# Patient Record
Sex: FEMALE | Race: WHITE
[De-identification: names, ages, dates, MRNs, and addresses within clinical notes are randomized per-mention and may not be internally consistent; named-entity substitution may affect disease eponyms.]

---

## 2020-12-28 ENCOUNTER — HOSPITAL ENCOUNTER (EMERGENCY)
Dept: HOSPITAL 46 - ED | Age: 50
Discharge: HOME | End: 2020-12-28
Payer: SELF-PAY

## 2020-12-28 VITALS — WEIGHT: 150 LBS | BODY MASS INDEX: 25.61 KG/M2 | HEIGHT: 64 IN

## 2020-12-28 DIAGNOSIS — F10.10: Primary | ICD-10-CM

## 2020-12-28 DIAGNOSIS — Z79.899: ICD-10-CM

## 2020-12-28 DIAGNOSIS — Z88.2: ICD-10-CM
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
## 2020-12-29 ENCOUNTER — HOSPITAL ENCOUNTER (EMERGENCY)
Dept: HOSPITAL 46 - ED | Age: 50
LOS: 1 days | Discharge: HOME | End: 2020-12-30
Payer: MEDICAID

## 2020-12-29 VITALS — BODY MASS INDEX: 25.61 KG/M2 | HEIGHT: 64 IN | WEIGHT: 150 LBS

## 2020-12-29 DIAGNOSIS — Z88.2: ICD-10-CM

## 2020-12-29 DIAGNOSIS — F10.129: ICD-10-CM
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
## 2020-12-29 DIAGNOSIS — T42.4X2A: Primary | ICD-10-CM

## 2020-12-29 DIAGNOSIS — Z79.899: ICD-10-CM

## 2020-12-29 PROCEDURE — G0480 DRUG TEST DEF 1-7 CLASSES: HCPCS

## 2020-12-30 NOTE — EKG
[facility]
                                    [address]
                                  [place], [place]  [address]
_________________________________________________________________________________________
                                                                 Signed   
 
 
Normal sinus rhythm
Rightward axis
Low voltage QRS
Nonspecific ST abnormality
Abnormal ECG
No previous ECGs available
Confirmed by DIONTE KIM DO ([id number]) on 12/30/2020 9:13:47 PM
 
 
 
 
 
 
 
 
 
 
 
 
 
 
 
 
 
 
 
 
 
 
 
 
 
 
 
 
 
 
 
 
 
 
 
 
 
 
    Electronically Signed By: DIONTE KIM DO  12/30/20 2114
_________________________________________________________________________________________
PATIENT NAME:     RUBI SHAIKH                
MEDICAL RECORD #: [id number]                     Electrocardiogram             
          ACCT #: [id number]  
DATE OF BIRTH:   [birth year]                                       
PHYSICIAN:   DIONTE KIM DO                     REPORT #: [id number]
REPORT IS CONFIDENTIAL AND NOT TO BE RELEASED WITHOUT AUTHORIZATION

## 2020-12-31 ENCOUNTER — HOSPITAL ENCOUNTER (EMERGENCY)
Dept: HOSPITAL 46 - ED | Age: 50
Discharge: HOME | End: 2020-12-31
Payer: MEDICAID

## 2020-12-31 VITALS — BODY MASS INDEX: 25.61 KG/M2 | WEIGHT: 150 LBS | HEIGHT: 64 IN

## 2020-12-31 DIAGNOSIS — Z79.899: ICD-10-CM
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
## 2020-12-31 DIAGNOSIS — F10.129: ICD-10-CM

## 2020-12-31 DIAGNOSIS — Z88.2: ICD-10-CM

## 2020-12-31 DIAGNOSIS — T42.4X2A: Primary | ICD-10-CM

## 2020-12-31 PROCEDURE — G0480 DRUG TEST DEF 1-7 CLASSES: HCPCS
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
## 2020-12-31 NOTE — EKG
[facility]
                                    [address]
                                  [place]  [address]
_________________________________________________________________________________________
                                                                 Signed   
 
 
Normal sinus rhythm
Nonspecific T wave abnormality
Prolonged QT
Abnormal ECG
When compared with ECG of 29-DEC-2020 23:25,
No significant change was found
Confirmed by DIONTE KIM DO ([id number]) on 12/31/2020 7:30:07 PM
 
 
 
 
 
 
 
 
 
 
 
 
 
 
 
 
 
 
 
 
 
 
 
 
 
 
 
 
 
 
 
 
 
 
 
 
 
 
    Electronically Signed By: DIONTE KIM DO  12/31/20 1930
_________________________________________________________________________________________
PATIENT NAME:     RUBI SHAIKH JASBIR                
MEDICAL RECORD #: [id number]                     Electrocardiogram             
          ACCT #: [id number]  
DATE OF BIRTH:   [birth year]                                       
PHYSICIAN:   DIONTE KIM DO                     REPORT #: [id number]
REPORT IS CONFIDENTIAL AND NOT TO BE RELEASED WITHOUT AUTHORIZATION

## 2021-01-01 ENCOUNTER — HOSPITAL ENCOUNTER (EMERGENCY)
Dept: HOSPITAL 46 - ED | Age: 51
Discharge: HOME | End: 2021-01-01
Payer: MEDICAID

## 2021-01-01 VITALS — BODY MASS INDEX: 25.61 KG/M2 | WEIGHT: 150 LBS | HEIGHT: 64 IN

## 2021-01-01 DIAGNOSIS — R19.7: Primary | ICD-10-CM

## 2021-01-01 DIAGNOSIS — R11.10: ICD-10-CM

## 2021-01-01 DIAGNOSIS — Z79.899: ICD-10-CM

## 2021-01-01 DIAGNOSIS — Z88.2: ICD-10-CM
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
## 2021-01-02 NOTE — EKG
[facility]
                                    [address]
                                  [place], [place]  [address]
_________________________________________________________________________________________
                                                                 Signed   
 
 
Normal sinus rhythm
Cannot rule out Anterior infarct , age undetermined
Abnormal ECG
When compared with ECG of 31-DEC-2020 18:31,
No significant change was found
Confirmed by ANI GARCIA MD ([id number]) on 1/2/2021 8:45:06 PM
 
 
 
 
 
 
 
 
 
 
 
 
 
 
 
 
 
 
 
 
 
 
 
 
 
 
 
 
 
 
 
 
 
 
 
 
 
 
 
    Electronically Signed By: ANI GARCIA MD  01/02/21 2045
_________________________________________________________________________________________
PATIENT NAME:     RUBI SHAIKH JASBIR                
MEDICAL RECORD #: [id number]                     Electrocardiogram             
          ACCT #: [id number]  
DATE OF BIRTH:   [birth year]                                       
PHYSICIAN:   ANI GARCIA MD                   REPORT #: [id number]
REPORT IS CONFIDENTIAL AND NOT TO BE RELEASED WITHOUT AUTHORIZATION
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
